# Patient Record
Sex: MALE | Race: BLACK OR AFRICAN AMERICAN | URBAN - NONMETROPOLITAN AREA
[De-identification: names, ages, dates, MRNs, and addresses within clinical notes are randomized per-mention and may not be internally consistent; named-entity substitution may affect disease eponyms.]

---

## 2019-08-19 ENCOUNTER — OFFICE VISIT (OUTPATIENT)
Dept: FAMILY MEDICINE | Facility: OTHER | Age: 29
End: 2019-08-19
Attending: NURSE PRACTITIONER
Payer: COMMERCIAL

## 2019-08-19 VITALS
RESPIRATION RATE: 16 BRPM | HEIGHT: 73 IN | HEART RATE: 73 BPM | OXYGEN SATURATION: 97 % | DIASTOLIC BLOOD PRESSURE: 82 MMHG | WEIGHT: 139.4 LBS | BODY MASS INDEX: 18.47 KG/M2 | SYSTOLIC BLOOD PRESSURE: 118 MMHG | TEMPERATURE: 99.1 F

## 2019-08-19 DIAGNOSIS — L02.416 ABSCESS OF LEFT THIGH: Primary | ICD-10-CM

## 2019-08-19 PROCEDURE — 99214 OFFICE O/P EST MOD 30 MIN: CPT | Performed by: PHYSICIAN ASSISTANT

## 2019-08-19 RX ORDER — FLUTICASONE PROPIONATE 50 MCG
SPRAY, SUSPENSION (ML) NASAL
Refills: 0 | COMMUNITY
Start: 2019-07-11

## 2019-08-19 RX ORDER — MUPIROCIN 20 MG/G
OINTMENT TOPICAL 3 TIMES DAILY
Qty: 15 G | Refills: 0 | Status: SHIPPED | OUTPATIENT
Start: 2019-08-19 | End: 2019-08-26

## 2019-08-19 ASSESSMENT — MIFFLIN-ST. JEOR: SCORE: 1656.19

## 2019-08-19 ASSESSMENT — PAIN SCALES - GENERAL: PAINLEVEL: WORST PAIN (10)

## 2019-08-19 NOTE — PATIENT INSTRUCTIONS
Abscess of medial left thigh  Indurated, tender, but not fluctuant today  Do not pinch or squeeze  Warm compress or warm bath 10-20 minutes 4-5 times daily  Topical antibiotic ointment 3 times daily  Cover with non adherent dressing so it doesn't rub on your clothing  Start augmentin 875 mg oral tablet, take twice daily for 7 days  Referral to surgery, they will call you to arrange a follow up appointment  Follow up with PCP as needed    An increase in redness or swelling    Red streaks in the skin leading away from the abscess    An increase in local pain or swelling    Fever of 100.4 F (38 C) or higher, or as directed by your healthcare provider    Patient Education     Abscess (Antibiotic Treatment Only)  An abscess (sometimes called a  boil ) happens when bacteria get trapped under the skin and start to grow. Pus forms inside the abscess as the body responds to the bacteria. An abscess can happen with an insect bite, ingrown hair, blocked oil gland, pimple, cyst, or puncture wound.  In the early stages, your wound may be red and tender. For this stage, you may get antibiotics. If the abscess does not get better with antibiotics, it will need to be drained with a small cut.  Home care  These tips will help you care for your abscess at home:    Soak the wound in hot water or apply hot packs (small towel soaked in hot water) to the area for 20 minutes at a time. Do this 3 to 4 times a day.    Do not cut, squeeze, or pop the boil yourself.    Apply antibiotic cream or ointment to the skin 3 to 4 times a day, unless something else was prescribed. Some ointments include an antibiotic plus a pain reliever.    If your doctor prescribed antibiotics, do not stop taking them until you have finished the medicine or the doctor tells you to stop.    You may use an over-the-counter pain medicine to control pain, unless another pain medicine was prescribed. If you have chronic liver or kidney disease or ever had a stomach ulcer  or gastrointestinal bleeding, talk with your doctor before using these any of these.  Follow-up care  Follow up with your healthcare provider, or as advised. Check your wound each day for the signs of worsening infection listed below.  When to seek medical advice  Get prompt medical attention if any of these occur:    An increase in redness or swelling    Red streaks in the skin leading away from the abscess    An increase in local pain or swelling    Fever of 100.4 F (38 C) or higher, or as directed by your healthcare provider    Pus or fluid coming from the abscess    Boil returns after getting better  Date Last Reviewed: 9/1/2016 2000-2018 [employer]. [address] [place] [address], [place] [address]. All rights reserved. This information is not intended as a substitute for professional medical care. Always follow your healthcare professional's instructions.

## 2019-08-19 NOTE — PROGRESS NOTES
"HPI:    Alejandro Ridley is a 28 year old male who presents to clinic today for ingrown hair or infection on his medial left leg near his groin.  Onset: 2 days ago.  Course is worsening.  Associated symptoms: Swelling, pain, drainage  Treatments: Patient has been squeezing it in the shower, he has gotten some drainage  No prior episodes  Feels well no fever  No other noted similar lesions    History reviewed. No pertinent past medical history.      Current Outpatient Medications   Medication Sig Dispense Refill     fluticasone (FLONASE) 50 MCG/ACT nasal spray USE 2 SPRAYS IN EACH NOSTRIL ONCE DAILY  0       Allergies   Allergen Reactions     Seasonal Allergies        ROS:  General- feels well no fever  Skin positive for lesion medial left leg    EXAM:  Vitals:    08/19/19 1821   BP: 118/82   BP Location: Left arm   Patient Position: Standing   Cuff Size: Adult Regular   Pulse: 73   Resp: 16   Temp: 99.1  F (37.3  C)   TempSrc: Tympanic   SpO2: 97%   Weight: 63.2 kg (139 lb 6.4 oz)   Height: 1.854 m (6' 1\")     General appearance: well appearing male, in no acute distress  Dermatological: Abscess medial left thigh near groin, 2 cm, tender to palpation, indurated without fluctuance, mildly erythematous over lesion.  No surrounding erythema or warmth  Psychological: normal affect, alert and pleasant    ASSESSMENT AND PLAN:    1. Abscess of left thigh      Abscess of medial left thigh  Indurated, tender, but not fluctuant today   Discussed plan to treat with warm packs and antibiotics  Do not pinch or squeeze  Warm compress or warm bath 10-20 minutes 4-5 times daily  Topical antibiotic ointment 3 times daily  Cover with non adherent dressing so it doesn't rub on your clothing  Start augmentin 875 mg oral tablet, take twice daily for 7 days  Referral to surgery, they will call you to arrange a follow up appointment  Follow up with PCP as needed  Patient received verbal and written instruction including review of warning " signs    Li Sandoval PA-C on 8/19/2019 at 8:31 PM

## 2019-08-19 NOTE — NURSING NOTE
"Chief Complaint   Patient presents with     Derm Problem     Pt has boil on right underside of groin for 2+ days.       Initial /82 (BP Location: Left arm, Patient Position: Standing, Cuff Size: Adult Regular)   Pulse 73   Temp 99.1  F (37.3  C) (Tympanic)   Resp 16   Ht 1.854 m (6' 1\")   Wt 63.2 kg (139 lb 6.4 oz)   SpO2 97%   BMI 18.39 kg/m   Estimated body mass index is 18.39 kg/m  as calculated from the following:    Height as of this encounter: 1.854 m (6' 1\").    Weight as of this encounter: 63.2 kg (139 lb 6.4 oz).  Medication Reconciliation: complete    Christiane Barrett LPN on 8/19/2019 at 6:24 PM    "

## (undated) RX ORDER — NEOMYCIN/BACITRACIN/POLYMYXINB 3.5-400-5K
OINTMENT (GRAM) TOPICAL
Status: DISPENSED
Start: 2019-08-19